# Patient Record
(demographics unavailable — no encounter records)

---

## 2024-12-06 NOTE — HEALTH RISK ASSESSMENT
[Yes] : Yes [2 - 4 times a month (2 pts)] : 2-4 times a month (2 points) [1 or 2 (0 pts)] : 1 or 2 (0 points) [Never (0 pts)] : Never (0 points) [No] : In the past 12 months have you used drugs other than those required for medical reasons? No [Never] : Never [1] : 2) Feeling down, depressed, or hopeless for several days (1) [PHQ-2 Negative - No further assessment needed] : PHQ-2 Negative - No further assessment needed [With Significant Other] : lives with significant other [Employed] : employed [] :  [Audit-CScore] : 2 [de-identified] : none [de-identified] : eats late, relatively low fiber, time restricted feeding with late window. Some office snacks [[id number]] : 2 [HIV test declined] : HIV test declined [Hepatitis C test declined] : Hepatitis C test declined [Change in mental status noted] : No change in mental status noted [Language] : denies difficulty with language [Behavior] : denies difficulty with behavior [Learning/Retaining New Information] : denies difficulty learning/retaining new information [Handling Complex Tasks] : denies difficulty handling complex tasks [Reasoning] : denies difficulty with reasoning [Spatial Ability and Orientation] : denies difficulty with spatial ability and orientation [None] : None [Sexually Active] : sexually active [High Risk Behavior] : no high risk behavior [Fully functional (bathing, dressing, toileting, transferring, walking, feeding)] : Fully functional (bathing, dressing, toileting, transferring, walking, feeding) [Fully functional (using the telephone, shopping, preparing meals, housekeeping, doing laundry, using] : Fully functional and needs no help or supervision to perform IADLs (using the telephone, shopping, preparing meals, housekeeping, doing laundry, using transportation, managing medications and managing finances) [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

## 2024-12-06 NOTE — HISTORY OF PRESENT ILLNESS
[FreeTextEntry1] : CPE [de-identified] : 34 F with no PMHx here to establish care. Last physical 6ish years ago. Has gone to urgent care for little things including a cold that turned into pneumonia for which she took multiple meds of unclear class. She had to stop them due to brain fog so possibly steroid vs atypical antibiotic reaction. Possible allergies given rash around mouth for past 3 months. Lips were really swollen and difficulty eating. Spread to skin around the mouth with skin peeling. No identifiable food trigger. Eating lots of rambutan but that doesn't seem to be causing it. Gained 20 pounds in past year.  Has been stressed for a long time and job is constantly stressful. Exercising less with busy work. Insomnia -- falls asleep okay if she doesn't stay at work too late. Wakes up every hour after a few hours of sleep.

## 2024-12-06 NOTE — REVIEW OF SYSTEMS
[Recent Change In Weight] : ~T recent weight change [Skin Rash] : skin rash [Headache] : headache [Insomnia] : insomnia [Anxiety] : anxiety [Negative] : Respiratory [Fainting] : no fainting [Confusion] : no confusion [Memory Loss] : no memory loss [Depression] : no depression [FreeTextEntry7] : bloating/dyspepsia

## 2024-12-06 NOTE — PHYSICAL EXAM
[No Acute Distress] : no acute distress [Normal Sclera/Conjunctiva] : normal sclera/conjunctiva [No Carotid Bruits] : no carotid bruits [No Varicosities] : no varicosities [No Edema] : there was no peripheral edema [No Extremity Clubbing/Cyanosis] : no extremity clubbing/cyanosis [Coordination Grossly Intact] : coordination grossly intact [Normal] : affect was normal and insight and judgment were intact [de-identified] : patchy aliya-orbital erythema with dry skin.